# Patient Record
Sex: MALE | Race: BLACK OR AFRICAN AMERICAN | Employment: FULL TIME | URBAN - NONMETROPOLITAN AREA
[De-identification: names, ages, dates, MRNs, and addresses within clinical notes are randomized per-mention and may not be internally consistent; named-entity substitution may affect disease eponyms.]

---

## 2022-12-12 ENCOUNTER — HOSPITAL ENCOUNTER (EMERGENCY)
Age: 32
Discharge: HOME | End: 2022-12-12
Attending: EMERGENCY MEDICINE
Payer: COMMERCIAL

## 2022-12-12 VITALS
OXYGEN SATURATION: 98 % | RESPIRATION RATE: 18 BRPM | HEART RATE: 72 BPM | SYSTOLIC BLOOD PRESSURE: 150 MMHG | TEMPERATURE: 98.2 F | DIASTOLIC BLOOD PRESSURE: 90 MMHG

## 2022-12-12 DIAGNOSIS — U07.1 COVID-19: Primary | ICD-10-CM

## 2022-12-12 LAB
FLUAV RNA SPEC QL NAA+PROBE: NOT DETECTED
FLUBV RNA SPEC QL NAA+PROBE: NOT DETECTED
SARS-COV-2, COV2: DETECTED

## 2022-12-12 PROCEDURE — 99283 EMERGENCY DEPT VISIT LOW MDM: CPT

## 2022-12-12 PROCEDURE — 87636 SARSCOV2 & INF A&B AMP PRB: CPT

## 2022-12-12 RX ORDER — ACETAMINOPHEN 500 MG
1000 TABLET ORAL EVERY 8 HOURS
Qty: 20 TABLET | Refills: 0 | Status: SHIPPED | OUTPATIENT
Start: 2022-12-12 | End: 2022-12-19

## 2022-12-12 RX ORDER — IBUPROFEN 800 MG/1
800 TABLET ORAL
Qty: 20 TABLET | Refills: 0 | Status: SHIPPED | OUTPATIENT
Start: 2022-12-12 | End: 2022-12-19

## 2022-12-12 NOTE — Clinical Note
[address]  [facility] EMERGENCY DEPT  [address] [id number]  [phone]    Work/School Note    Date: 12/12/2022    To Whom It May concern:    Cayrn Russell was seen and treated today in the emergency room by the following provider(s):  Attending Provider: Geo Campo MD.      Caryn Russell is excused from work/school on 12/12/22 and 12/13/22. He is medically clear to return to work/school on 12/14/2022.        Sincerely,          Pinky Pineda MD

## 2022-12-12 NOTE — DISCHARGE INSTRUCTIONS
I recommend taking 800 mg of ibuprofen in addition to 1000 mg of Tylenol every 8 hours for pain. Schedule this for the next 2 to 3 days and then continue as needed.

## 2022-12-12 NOTE — ED TRIAGE NOTES
Pt reports positive at home covid test on 12/5/2022. Pt reports slight improvement to body aches, congestion, and cough. Pain is 6/10.

## 2022-12-12 NOTE — ED PROVIDER NOTES
EMERGENCY DEPARTMENT HISTORY AND PHYSICAL EXAM      Date: 12/12/2022  Patient Name: Triston Hsu    History of Presenting Illness     Chief Complaint   Patient presents with    Positive For Covid-19       History Provided By: Patient    HPI: Triston Hsu, 34 y.o. male with history of prediabetes started having Cough, congestion 5 days ago. He took a COVID test 4 days ago which was positive. His primary issue has been generalized fatigue, body aches, muscle aches. No nausea, vomiting, diarrhea. He states he is here in part because he needs a COVID test for work. There are no other complaints, changes, or physical findings at this time. Past History     Past Medical History:  No past medical history on file. Past Surgical History:  No past surgical history on file. Family History:  No family history on file. Social History: Allergies:  No Known Allergies    PCP: None    No current facility-administered medications on file prior to encounter. No current outpatient medications on file prior to encounter. Review of Systems   Review of Systems   Constitutional:  Positive for activity change and appetite change. Negative for chills and fever. HENT:  Positive for congestion. Negative for sore throat. Eyes:  Negative for redness. Respiratory:  Positive for cough. Negative for shortness of breath. Cardiovascular:  Negative for chest pain. Gastrointestinal:  Negative for abdominal pain, nausea and vomiting. Genitourinary:  Negative for flank pain. Musculoskeletal:  Negative for myalgias. Skin:  Negative for rash. Neurological:  Negative for headaches. Physical Exam   Physical Exam  Vitals and nursing note reviewed. Constitutional:       General: He is not in acute distress. Appearance: Normal appearance. HENT:      Head: Normocephalic and atraumatic.       Mouth/Throat:      Mouth: Mucous membranes are moist.   Eyes:      Extraocular Movements: Extraocular movements intact. Conjunctiva/sclera: Conjunctivae normal.   Cardiovascular:      Rate and Rhythm: Normal rate and regular rhythm. Pulmonary:      Effort: Pulmonary effort is normal. No respiratory distress. Breath sounds: Normal breath sounds. No wheezing, rhonchi or rales. Abdominal:      General: There is no distension. Palpations: Abdomen is soft. Tenderness: There is no abdominal tenderness. Musculoskeletal:         General: Normal range of motion. Cervical back: Normal range of motion. Skin:     General: Skin is warm and dry. Neurological:      General: No focal deficit present. Mental Status: He is alert and oriented to person, place, and time. Mental status is at baseline. Lab and Diagnostic Study Results   Labs -   No results found for this or any previous visit (from the past 12 hour(s)). Radiologic Studies -   @lastxrresult@  CT Results  (Last 48 hours)      None          CXR Results  (Last 48 hours)      None            Medical Decision Making and ED Course   Differential Diagnosis & Medical Decision Making Provider Note:   Patient presenting with viral syndrome with recent home positive COVID test.  Here requesting testing for work. Continues to feel weak with cough, body aches, shortness of breath. Normal vital signs. Discussed symptomatic care, anticipatory guidance, return precautions. - I am the first provider for this patient. I reviewed the vital signs, available nursing notes, past medical history, past surgical history, family history and social history. The patients presenting problems have been discussed, and they are in agreement with the care plan formulated and outlined with them. I have encouraged them to ask questions as they arise throughout their visit. Vital Signs-Reviewed the patient's vital signs.   Patient Vitals for the past 12 hrs:   Temp Pulse Resp BP SpO2   12/12/22 1238 98.2 °F (36.8 °C) 65 20 (!) 153/109 98 %       ED Course:             Disposition   Disposition: DC- Adult Discharges: All of the diagnostic tests were reviewed and questions answered. Diagnosis, care plan and treatment options were discussed. The patient understands the instructions and will follow up as directed. The patients results have been reviewed with them. They have been counseled regarding their diagnosis. The patient verbally convey understanding and agreement of the signs, symptoms, diagnosis, treatment and prognosis and additionally agrees to follow up as recommended with their PCP in 24 - 48 hours. They also agree with the care-plan and convey that all of their questions have been answered. I have also put together some discharge instructions for them that include: 1) educational information regarding their diagnosis, 2) how to care for their diagnosis at home, as well a 3) list of reasons why they would want to return to the ED prior to their follow-up appointment, should their condition change. DISCHARGE PLAN:  1. Current Discharge Medication List        START taking these medications    Details   ibuprofen (MOTRIN) 800 mg tablet Take 1 Tablet by mouth every eight (8) hours as needed for Pain for up to 7 days. Qty: 20 Tablet, Refills: 0      acetaminophen (Tylenol Extra Strength) 500 mg tablet Take 2 Tablets by mouth every eight (8) hours for 7 days. Qty: 20 Tablet, Refills: 0           2. Follow-up Information    None       3. Return to ED if worse   4. Current Discharge Medication List        START taking these medications    Details   ibuprofen (MOTRIN) 800 mg tablet Take 1 Tablet by mouth every eight (8) hours as needed for Pain for up to 7 days. Qty: 20 Tablet, Refills: 0  Start date: 12/12/2022, End date: 12/19/2022      acetaminophen (Tylenol Extra Strength) 500 mg tablet Take 2 Tablets by mouth every eight (8) hours for 7 days.   Qty: 20 Tablet, Refills: 0  Start date: 12/12/2022, End date: 12/19/2022 Diagnosis/Clinical Impression     Clinical Impression:   1. COVID-19        Attestations: Thang Martinez MD, am the primary clinician of record. Please note that this dictation was completed with [employer], the computer voice recognition software. Quite often unanticipated grammatical, syntax, homophones, and other interpretive errors are inadvertently transcribed by the computer software. Please disregard these errors. Please excuse any errors that have escaped final proofreading. Thank you.